# Patient Record
Sex: MALE | Race: WHITE | Employment: UNEMPLOYED | URBAN - METROPOLITAN AREA
[De-identification: names, ages, dates, MRNs, and addresses within clinical notes are randomized per-mention and may not be internally consistent; named-entity substitution may affect disease eponyms.]

---

## 2018-07-30 ENCOUNTER — OFFICE VISIT (OUTPATIENT)
Dept: NEUROLOGY | Age: 23
End: 2018-07-30

## 2018-07-30 VITALS
SYSTOLIC BLOOD PRESSURE: 110 MMHG | OXYGEN SATURATION: 99 % | DIASTOLIC BLOOD PRESSURE: 70 MMHG | HEART RATE: 52 BPM | WEIGHT: 166 LBS | BODY MASS INDEX: 24.59 KG/M2 | HEIGHT: 69 IN | TEMPERATURE: 98 F

## 2018-07-30 DIAGNOSIS — R25.9 ABNORMAL INVOLUNTARY MOVEMENT: ICD-10-CM

## 2018-07-30 DIAGNOSIS — G25.0 ESSENTIAL TREMOR: Primary | ICD-10-CM

## 2018-07-30 NOTE — PROGRESS NOTES
Delmis We-07-A 1498 [address] [facility] Kellie Chaparro 59  
[address] [phone] Fax Referring: Self Chief Complaint Patient presents with  Tremors  
  new patient 82-year-old right-handed gentleman who comes today for evaluation of what he calls uncontrollable shaking. He tells me that he noticed shaking of his hands while he was in high school. It was sporadic. He says he notices it more when he is anxious or emotional.  He says that he can occur anytime in the day. His mother will take notice but he does not notice. It does not bother him in terms of interfering with activities and that his writing has not been effective. His head does not shake. He does not spill food. Has no difficulty with pouring drinks holding drinks etc.  It is bilateral.  He says that again if he gets anxious or upset it will increase and then when he calms down it goes away. No hemibody tremor. No loss or alteration in consciousness. No visual disturbance. No numbness or tingling. No chest pain. No palpitations. No fever or chills. No head trauma. He has not noticed any change with alcohol. No family history of tremor. No vocal tremor. Review the electronic medical record finds that in 2012 he had an emergency department visit where he took Prozac in excess as an apparent suicide attempt. Past Medical History:  
Diagnosis Date  Anxiety  Asthma  Concussion  Depression No past surgical history on file. Current Outpatient Prescriptions Medication Sig Dispense Refill  FLUoxetine (PROZAC) 40 mg capsule Take 40 mg by mouth daily. No Known Allergies Social History Substance Use Topics  Smoking status: Never Smoker  Smokeless tobacco: Not on file  Alcohol use Not on file No family history on file. As noted above no family history of tremor or other neurologic disorder.   No other major illnesses such as coronary artery disease etc. 
 
Review of Systems Pertinent positives and negatives as noted with remainder of comprehensive review negative Examination Visit Vitals  /70  Pulse (!) 52  Temp 98 °F (36.7 °C)  Ht 5' 9\" (1.753 m)  Wt 75.3 kg (166 lb)  SpO2 99%  BMI 24.51 kg/m2 Pleasant, well appearing gentleman who has appropriate dress and grooming. His affect is appropriate today. No icterus. Oropharynx clear. Supple neck without bruit. Heart regular. No murmur. His pulses are symmetrical in the upper and lower extremities bilaterally. No edema. Neurologically he is awake alert oriented and conversant. He has normal speech-language cognition and attention. Cranial nerves are intact 2-12. No nystagmus. Disc margins are flat. Visual fields full to confrontation. No pronation or drift today. Bulk and tone are normal.  He has postural tremor of the outstretched hands. There is intention on finger-nose-finger. There is no rest tremor. No cogwheeling. He resists fully in the upper and lower extremities in all muscle groups to direct testing. Reflexes are symmetrical in the upper and lower extremities bilaterally and there are no pathologic reflexes elicited. No ataxia. Sensory is intact primary. His gait is steady. Impression/Plan Essential tremor. Discussed the difference between Parkinson's disease and essential tremor. Discussed the benign but bothersome course. Discussed potential treatment modalities  Including Mysoline and Inderal as well as their potential side effects. Discussed also this is a disorder that does not need to be treated unless bothersome enough to take a daily medication and the medications do nothing but treat the symptoms, not the disorder--ie no cure. At the present time he does not feel like his symptoms are bothersome enough to take medication.   We did discuss there are various medications that could be used and certainly if he finds the tremor gets worse with age which it typically can and we discussed this or if he just finds that the symptoms are more bothersome and he would like to try medication he will call and let us know. Otherwise he will follow-up as needed. Lisbeth Fishman MD 
 
This note was created using voice recognition software. Despite editing, there may be syntax errors. This note will not be viewable in [address]

## 2018-07-30 NOTE — PROGRESS NOTES
New patient here for tremors,  Sx started 3-4 years ago,   Sx are the same , comes and goes , sx are worse with stress.

## 2018-07-30 NOTE — MR AVS SNAPSHOT
[address] [place] [address] [address] [place] Baires [address] [phone] Patient: Shalom Bah MRN: [id number]  Visit Information Date & Time Provider Department Dept. Phone Encounter #  
 2018 10:00 AM Jayden Aviles MD [facility] Neurology [facility] [phone] [id number] Follow-up Instructions Return if symptoms worsen or fail to improve. Upcoming Health Maintenance Date Due Hepatitis A Peds Age 1-18 (1 of 2 - Standard Series) 1998 DTaP/Tdap/Td series (1 - Tdap) 2004 HPV Age 9Y-34Y (1 of 1 - Male 3 Dose Series) 2008 Influenza Age 5 to Adult 2018 Allergies as of 2018  Review Complete On: 2018 By: Jayden Aviles MD  
 No Known Allergies Current Immunizations  Never Reviewed No immunizations on file. Not reviewed this visit You Were Diagnosed With   
  
 Codes Comments Essential tremor    -  Primary ICD-10-CM: G25.0 ICD-9-CM: 333.1 Abnormal involuntary movement     ICD-10-CM: R25.9 ICD-9-CM: 911. 0 Vitals BP Pulse Temp Height(growth percentile) Weight(growth percentile) SpO2  
 110/70 (!) 52 98 °F (36.7 °C) 5' 9\" (1.753 m) 166 lb (75.3 kg) 99% BMI Smoking Status 24.51 kg/m2 Never Smoker BMI and BSA Data Body Mass Index Body Surface Area 24.51 kg/m 2 1.91 m 2 Your Updated Medication List  
  
   
This list is accurate as of 18 10:26 AM.  Always use your most recent med list.  
  
  
  
  
 ABILIFY 2 mg tablet Generic drug:  ARIPiprazole Take 4 mg by mouth daily. PROzac 40 mg capsule Generic drug:  FLUoxetine Take 40 mg by mouth daily. SINGULAIR 5 mg chewable tablet Generic drug:  montelukast  
Take 5 mg by mouth nightly. We Performed the Following CERULOPLASMIN [id number] CPT(R)] COPPER [id number] CPT(R)] Follow-up Instructions Return if symptoms worsen or fail to improve. Introducing [facility]! [employer] introduces [employer] patient portal. Now you can access parts of your medical record, email your doctor's office, and request medication refills online. 1. In your internet browser, go to [email or web address] [employer] in [email or web address]/[employer] 2. Click on the First Time User? Click Here link in the Sign In box. You will see the New Member Sign Up page. 3. Enter your [employer] Access Code exactly as it appears below. You will not need to use this code after youve completed the sign-up process. If you do not sign up before the expiration date, you must request a new code. · [employer] Access Code: [email or web address] Expires: 10/28/2018  9:16 AM 
 
4. Enter the last four digits of your Social Security Number (xxxx) and Date of Birth (mm/dd/yyyy) as indicated and click Submit. You will be taken to the next sign-up page. 5. Create a [employer] ID. This will be your [employer] login ID and cannot be changed, so think of one that is secure and easy to remember. 6. Create a [employer] password. You can change your password at any time. 7. Enter your Password Reset Question and Answer. This can be used at a later time if you forget your password. 8. Enter your e-mail address. You will receive e-mail notification when new information is available in [address] 9. Click Sign Up. You can now view and download portions of your medical record. 10. Click the Download Summary menu link to download a portable copy of your medical information. If you have questions, please visit the Frequently Asked Questions section of the [employer] website. Remember, [employer] is NOT to be used for urgent needs. For medical emergencies, dial 911. Now available from your iPhone and Android! Please provide this summary of care documentation to your next provider. Your primary care clinician is listed as Phys Other.  If you have any questions after today's visit, please call [phone].

## 2018-08-01 LAB
CERULOPLASMIN SERPL-MCNC: 19.8 MG/DL (ref 16–31)
COPPER SERPL-MCNC: 82 UG/DL (ref 72–166)